# Patient Record
Sex: FEMALE | URBAN - METROPOLITAN AREA
[De-identification: names, ages, dates, MRNs, and addresses within clinical notes are randomized per-mention and may not be internally consistent; named-entity substitution may affect disease eponyms.]

---

## 2020-06-02 ENCOUNTER — OFFICE VISIT (OUTPATIENT)
Dept: URBAN - METROPOLITAN AREA CLINIC 96 | Facility: CLINIC | Age: 25
End: 2020-06-02
Payer: COMMERCIAL

## 2020-06-02 ENCOUNTER — WEB ENCOUNTER (OUTPATIENT)
Dept: URBAN - METROPOLITAN AREA CLINIC 96 | Facility: CLINIC | Age: 25
End: 2020-06-02

## 2020-06-02 ENCOUNTER — DASHBOARD ENCOUNTERS (OUTPATIENT)
Age: 25
End: 2020-06-02

## 2020-06-02 ENCOUNTER — LAB OUTSIDE AN ENCOUNTER (OUTPATIENT)
Dept: URBAN - METROPOLITAN AREA CLINIC 96 | Facility: CLINIC | Age: 25
End: 2020-06-02

## 2020-06-02 DIAGNOSIS — R93.5 ABNORMAL ABDOMINAL ULTRASOUND: ICD-10-CM

## 2020-06-02 DIAGNOSIS — R94.5 ABNORMAL LFTS: ICD-10-CM

## 2020-06-02 DIAGNOSIS — R11.2 NAUSEA & VOMITING: ICD-10-CM

## 2020-06-02 DIAGNOSIS — R10.11 RUQ PAIN: ICD-10-CM

## 2020-06-02 PROCEDURE — 99204 OFFICE O/P NEW MOD 45 MIN: CPT | Performed by: INTERNAL MEDICINE

## 2020-06-02 PROCEDURE — G8417 CALC BMI ABV UP PARAM F/U: HCPCS | Performed by: INTERNAL MEDICINE

## 2020-06-02 PROCEDURE — G8427 DOCREV CUR MEDS BY ELIG CLIN: HCPCS | Performed by: INTERNAL MEDICINE

## 2020-06-02 RX ORDER — ONDANSETRON 4 MG/1
1 TABLET ON THE TONGUE AND ALLOW TO DISSOLVE TABLET, ORALLY DISINTEGRATING ORAL ONCE A DAY
Status: ACTIVE | COMMUNITY

## 2020-06-02 RX ORDER — TRAMADOL HYDROCHLORIDE 50 MG/1
1 TABLET AS NEEDED TABLET, FILM COATED ORAL ONCE A DAY
Status: ACTIVE | COMMUNITY

## 2020-06-02 NOTE — PHYSICAL EXAM GASTROINTESTINAL
Abdomen , soft, tender in RUQ with no rebound. , no guarding or rigidity , no masses palpable , normal bowel sounds , Liver and Spleen , no hepatomegaly present , no hepatosplenomegaly , liver nontender , spleen not palpable , Abdomen , soft, nontender, nondistended , no guarding or rigidity , no masses palpable , normal bowel sounds , Liver and Spleen , no hepatomegaly present , no hepatosplenomegaly , liver nontender , spleen not palpable

## 2020-06-02 NOTE — PHYSICAL EXAM CHEST:
no lesions,  no deformities,  no traumatic injuries,  no significant scars are present,  chest wall non-tender,  no masses present,  tactile fremitus is normal, breathing is unlabored without accessory muscle use, normal breath sounds , no lesions,  no deformities,  no traumatic injuries,  no significant scars are present,  chest wall non-tender,  no masses present,  tactile fremitus is normal, breathing is unlabored without accessory muscle use,normal breath sounds

## 2020-06-02 NOTE — PHYSICAL EXAM EYES:
Conjuntivae and eyelids appear normal, Sclerae : White without injection , Conjuntivae and eyelids appear normal, Sclerae : White without injection

## 2020-06-02 NOTE — HPI-OTHER HISTORIES
Patient reports had onset of abdominal pain f/c onset after 4 hours after eating at [employer]. Nausea with emesis. No coffee ground emesis. No prior such episode. See at [facility] ER 5/30/2020 with . , lipase 16, bili 0.6, WBC 12.5, Hb 13.2. [facility] ultrasound 5/30/2020 severely thickened gallbaldder wall with no calculus. (see chart for reviewed results). Reportedly was seen by surgeon in the ER, surgery was considered but not pursued per patient given she had improvement in her symptoms. No herbal or alternative meds. No chronic alcohol. No chance pregnant. No hx of IVDA or hepatitis. No NSAIDs.

## 2020-06-03 LAB
ALBUMIN: 4.5
ALKALINE PHOSPHATASE: 90
ALT (SGPT): 175
AST (SGOT): 41
BILIRUBIN, DIRECT: 0.08
BILIRUBIN, TOTAL: <0.2
PROTEIN, TOTAL: 7.9

## 2020-06-09 ENCOUNTER — LAB OUTSIDE AN ENCOUNTER (OUTPATIENT)
Dept: URBAN - METROPOLITAN AREA CLINIC 96 | Facility: CLINIC | Age: 25
End: 2020-06-09

## 2020-09-15 ENCOUNTER — OFFICE VISIT (OUTPATIENT)
Dept: URBAN - METROPOLITAN AREA CLINIC 96 | Facility: CLINIC | Age: 25
End: 2020-09-15

## 2023-05-31 NOTE — PHYSICAL EXAM HENT:
Head, normocephalic, atraumatic, Face, Face within normal limits, Ears, External ears within normal limits, Nose/Nasopharynx, External nose  normal appearance, nares patent, no nasal discharge, Mouth and Throat, Oral cavity appearance normal, Breath odor normal, Lips, Appearance normal , Head, normocephalic, atraumatic, Face, Face within normal limits, Ears, External ears within normal limits, Nose/Nasopharynx, External nose  normal appearance, nares patent, no nasal discharge, Mouth and Throat, Oral cavity appearance normal, Breath odor normal, Lips, Appearance normal Preoperative History and Physical    REQUESTING PHYSICIAN  Henry Aguilera MD    PRIMARY CARE PHYSICIAN  Misty Villafana APNP     CHIEF COMPLAINT  Preoperative risk assessment for Right Anterior Total Hip Arthroplasty.     HPI  Mrs. Annemarie Kraemr is a pleasant 59 year old  female who presents for evaluation of her chronic problems and for preoperative risk assessment in anticipation of her upcoming Right Anterior Total Hip Arthroplasty surgery under monitored (local) anesthesia care.    Mrs. Annemarie Kramer's medical problems include malignant melanoma of the skin, migraine, hip pain.     Overall, patient seems to be doing well. She is at her baseline mental and physical health and is able to manage her basic ADLs. Patient today denies any unusual headaches, lightheadedness, dizziness, sinus pressure, nausea, vomiting, chest pain, palpitations, shortness of breath, abdominal pain, dysuria or leg cramps. She feel well.       REVIEW OF SYSTEMS  As mentioned above in history of present illness.The rest of the 10 point review of systems were negative.    PROBLEM LIST  Patient Active Problem List   Diagnosis   • Migraine without aura and without status migrainosus, not intractable   • Depression   • Migraine headache   • MM (malignant melanoma of skin) (CMD)        MEDICAL HISTORY  Past Medical History:   Diagnosis Date   • Cancer (CMD)    • Migraines         FAMILY HISTORY  Family History   Problem Relation Age of Onset   • Cancer, Skin Melanoma Mother         had spot removed, no chemo- just follow up   • Heart disease Mother         AICD   • Heart disease Father 39        MI   • Patient is unaware of any medical problems Sister    • Heart disease Maternal Grandmother    • Heart disease Paternal Grandmother    • Dementia/Alzheimers Paternal Grandmother    • Cancer Maternal Grandfather         lung with mets   • Parkinsonism Paternal Grandfather         SOCIAL HISTORY  Social History     Socioeconomic History    • Marital status: /Civil Union     Spouse name: Not on file   • Number of children: Not on file   • Years of education: Not on file   • Highest education level: Not on file   Occupational History     Comment: [employer]   Tobacco Use   • Smoking status: Never   • Smokeless tobacco: Never   Vaping Use   • Vaping Use: never used   Substance and Sexual Activity   • Alcohol use: Yes     Alcohol/week: 7.0 standard drinks of alcohol     Types: 7 Glasses of wine per week     Comment: nightly   • Drug use: Never   • Sexual activity: Yes     Partners: Male     Birth control/protection: Post-menopausal   Other Topics Concern   •  Service No   • Blood Transfusions No   • Caffeine Concern No   • Occupational Exposure No   • Hobby Hazards No   • Sleep Concern No   • Stress Concern No   • Weight Concern No   • Special Diet No   • Back Care Yes   • Exercise Yes   • Bike Helmet Not Asked   • Seat Belt Yes   • Self-Exams Yes   Social History Narrative   • Not on file     Social Determinants of Health     Financial Resource Strain: Not on file   Food Insecurity: Not on file   Transportation Needs: Not on file   Physical Activity: Not on file   Stress: Not on file   Social Connections: Not on file   Intimate Partner Violence: Not At Risk (2/8/2022)    Intimate Partner Violence    • Social Determinants: Intimate Partner Violence Past Fear: No    • Social Determinants: Intimate Partner Violence Current Fear: No        SURGICAL HISTORY  History reviewed. No pertinent surgical history.     CURRENT MEDICATIONS  Current Outpatient Medications   Medication Sig Dispense Refill   • ferrous sulfate 325 (65 FE) MG tablet Take 1 tablet by mouth daily (with breakfast). 30 tablet 0   • gabapentin (NEURONTIN) 100 MG capsule Take 1 capsule by mouth in the morning and 1 capsule at noon and 1 capsule in the evening. 65 capsule 0   • clobetasol (TEMOVATE) 0.05 % cream Apply to areas of dermatitis BID two to three weeks then as  needed. 45 g 0   • SUMAtriptan (IMITREX) 50 MG tablet Take by mouth once as needed for migraine. Can take 2nd tablet in migraine not improved in 2 hours. No more than 4 tablets in 24 hours. 20 tablet 3   • Loperamide HCl (IMODIUM A-D PO) Take by mouth as needed.     • Probiotic Product (PROBIOTIC DAILY PO)      • NON FORMULARY 2 times daily. Thyroid Resilience      • Melatonin 10 MG Tab Take 40 mg by mouth nightly.      • prochlorperazine (COMPAZINE) 10 MG tablet Take 1 tablet by mouth every 6 hours as needed for Nausea or Vomiting. 30 tablet 5   • MAGNESIUM GLYCINATE PO Take 100 mg by mouth daily.      • Misc Natural Products (DAILY HERBS IMMUNE DEFENSE PO) Take 2 tablets by mouth daily as needed.     • Ascorbic Acid (vitamin C) 1000 MG tablet Take 1,000 mg by mouth daily.     • Turmeric 500 MG Tab Take 1,500 mg by mouth.     • VITAMIN D, CHOLECALCIFEROL, PO Take 10,000 mg by mouth daily.       No current facility-administered medications for this visit.        ALLERGIES  ALLERGIES:   Allergen Reactions   • Neomycin HIVES     Antibiotic cream        PHYSICAL EXAM  Vitals:    Visit Vitals  BP 98/60 (BP Location: RUE - Right upper extremity, Patient Position: Sitting, Cuff Size: Regular)   Pulse 86   Ht 5' 2.5\" (1.588 m)   Wt 59.1 kg (130 lb 6.4 oz)   SpO2 96%   BMI 23.47 kg/m²      Constitutional: Patient is well built, well groomed and appears stated age, not in acute distress   HENT: Normocephalic. Atraumatic. Bilateral external ears and tympanic membranes normal; hearing grossly intact. Oropharynx moist. No oral exudates. No tonsillar or uvular edema. Nose normal.   Neck: Normal range of motion. No tenderness. Supple. No stridor.   Eyes: PERRL, EOMI. Conjunctivae normal. No discharge. Positive leucorrhea bilateral eyes. Positive ptosis.   Cardiovascular: Normal rate & rhythm. S1-S2 heard. No murmurs, gallops or rubs. No pedal edema   Respiratory: No respiratory distress. Normal breath sounds. No rales. No  wheezing.   GI: Bowel sounds normal. Soft. No tenderness. No masses. No pulsatile masses.   Integument: Warm. Dry. No erythema. No rash.   Musculoskeletal: Intact distal pulses. No tenderness. No cyanosis. No clubbing. Good range of motion in all major joints. No tenderness to palpation or major deformities noted. Back - no tenderness.   Neurologic : Alert & oriented x 3. Normal motor function. Normal sensory function. No focal deficits noted.   Psychiatric: Patient answers questions appropriately, IQ normal, speech is spontaneous, unpressured; makes frequent eye contact. Mood is pleasant, affect appropriate.  EKG  To be reviewed by .    ASSESSMENT  1.  Preoperative risk assessment for Right Anterior Total Hip Arthroplasty.    PLAN  - There is presently no medical contraindication for the planned Right Anterior Total Hip Arthroplasty surgery in this patient with low perioperative cardiovascular event risk.  her  Revised Cardiac Risk Index (RCRI) for perioperative cardiac event (cardiac death, nonfatal MI, and nonfatal cardiac arrest) = 3.9 % which is low. She does not need further cardiac testing prior to surgery.     - her recent electrolytes from 5/24/2023 and EKG obtained today were personally reviewed and are acceptable. Dr. Galeana will be doing the final review of the EKG.    - Pre-operative medication management: Patient is not on any anticoagulants. Medications to be held per surgeon's recommendations.    Thank you very much for allowing me to participate in the pre-operative evaluation of this patient. Please do not hesitate to contact my office for any questions or concerns regarding care.     A copy of this consultation will be sent to the referring physician, Henry Aguilera MD.    DONALD Steve  [facility] [place]  [address]  [place] [address]